# Patient Record
Sex: FEMALE | Employment: FULL TIME | URBAN - METROPOLITAN AREA
[De-identification: names, ages, dates, MRNs, and addresses within clinical notes are randomized per-mention and may not be internally consistent; named-entity substitution may affect disease eponyms.]

---

## 2023-01-06 ENCOUNTER — OFFICE VISIT (OUTPATIENT)
Dept: ORTHOPEDIC SURGERY | Age: 68
End: 2023-01-06
Payer: MEDICARE

## 2023-01-06 VITALS — HEART RATE: 98 BPM | WEIGHT: 206.2 LBS | TEMPERATURE: 96.9 F | OXYGEN SATURATION: 96 %

## 2023-01-06 DIAGNOSIS — S67.190A CRUSHING INJURY OF RIGHT INDEX FINGER, INITIAL ENCOUNTER: Primary | ICD-10-CM

## 2023-01-06 RX ORDER — TRIAMCINOLONE ACETONIDE 1 MG/G
CREAM TOPICAL
COMMUNITY
Start: 2022-10-11

## 2023-01-06 RX ORDER — MIRABEGRON 50 MG/1
TABLET, FILM COATED, EXTENDED RELEASE ORAL
COMMUNITY
Start: 2022-10-11

## 2023-01-06 RX ORDER — ROSUVASTATIN CALCIUM 5 MG/1
TABLET, COATED ORAL
COMMUNITY
Start: 2022-10-11

## 2023-01-06 NOTE — PROGRESS NOTES
Patient: Cedrick Huitron                MRN: [id number]       SSN: [id number]  YOB: 1955        AGE: 79 y.o. SEX: female    PCP: Jenny Chambers MD  01/06/23    Chief Complaint   Patient presents with    Hand Pain     Right second finger       HISTORY:  Cedrick Huitron is a 79 y.o. female who sustained a right index finger injury in early 12/2022. She states she smashed her distal right index finger between two buckets while she was moving things around in her garage to decorate for the holidays. She has not received any treatment for this injury. She has been experiencing right index finger pain and swelling since the injury. She feels pain with any gripping action. She denies any catching or popping of the right index finger. She has a h/o laceration to the right index finger middle phalanx, for which she underwent occupational therapy. Occupation, etc:  Ms. Natalie Galvez is retired. She served as a  in the 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 She lives in [place] with her . She has 2 sons and 2 stepchildren. One of her son is a [address] and the other teaches in [place]. She will be traveling to [place] with her son. She enjoys sewing, knitting, and cooking. Ms. Natalie Galvez weighs 206 lbs. She is not diabetic. No results found for: HBA1C, DZU0NDKK, WDK3GIQC, PVV8ZOCR  Weight Metrics 1/6/2023   Weight 206 lb 3.2 oz       There is no problem list on file for this patient.     REVIEW OF SYSTEMS:    Constitutional Symptoms: Negative   Eyes: Negative   Ears, Nose, Throat and Mouth: Negative   Cardiovascular: Negative   Respiratory: Negative   Genitourinary: Per HPI   Gastrointestinal: Per HPI   Integumentary (Skin and/or Breast): Negative   Musculoskeletal: Per HPI   Endocrine/Rheumatologic: Negative   Neurological: Per HPI   Hematology/Lymphatic: Negative    Allergic/Immunologic: Negative   Phychiatric: Negative    Social History     Socioeconomic History    Marital status:      Spouse name: Not on file    Number of children: Not on file    Years of education: Not on file    Highest education level: Not on file   Occupational History    Not on file   Tobacco Use    Smoking status: Not on file    Smokeless tobacco: Not on file   Substance and Sexual Activity    Alcohol use: Not on file    Drug use: Not on file    Sexual activity: Not on file   Other Topics Concern    Not on file   Social History Narrative    Not on file     Social Determinants of Health     Financial Resource Strain: Not on file   Food Insecurity: Not on file   Transportation Needs: Not on file   Physical Activity: Not on file   Stress: Not on file   Social Connections: Not on file   Intimate Partner Violence: Not on file   Housing Stability: Not on file      No Known Allergies   Current Outpatient Medications   Medication Sig    Myrbetriq 50 mg ER tablet     mirabegron ER (MYRBETRIQ) 50 mg ER tablet Take 50 mg by mouth daily. rosuvastatin (CRESTOR) 5 mg tablet     triamcinolone acetonide (KENALOG) 0.1 % topical cream APPLY TOPICALLY TO THE AFFECTED AREA TWICE DAILY FOR 14 DAYS     No current facility-administered medications for this visit. PHYSICAL EXAMINATION:  Visit Vitals  Pulse 98   Temp 96.9 °F (36.1 °C) (Temporal)   Wt 206 lb 3.2 oz (93.5 kg)   SpO2 96%      ORTHO EXAMINATION:  Examination Right Hand Left Hand   Skin Intact; slight erythema Intact   Deformity - -   Swelling - -   Tenderness - -   Finger flexion Full Full   Finger extension Full Full   Sensation Normal Normal   Capillary refill Normal Normal   Heberden's nodes - -   Dupuytren's - -        IMPRESSION:      ICD-10-CM ICD-9-CM    1. Crushing injury of right index finger, initial encounter  S67.190A 927.3 REFERRAL TO OCCUPATIONAL THERAPY        PLAN:  There is no need for injections or surgery at this time.  Patient was fitted comfortably with a right index finger Link splint. She will start a brief course of outpatient occupational therapy. She will follow up with Dr. Mack Harada if desired.       Scribed by Mike Jensen ([address] [place] [address] [address]) as dictated by Uday Landa MD